# Patient Record
Sex: MALE | Race: WHITE | URBAN - METROPOLITAN AREA
[De-identification: names, ages, dates, MRNs, and addresses within clinical notes are randomized per-mention and may not be internally consistent; named-entity substitution may affect disease eponyms.]

---

## 2017-05-02 NOTE — H&P
[facility]  TOTAL EYE CARE  [address] [facility] [address]  [phone]  Dept: [phone]    OPHTHALMOLOGY PRE-OPERATIVE  HISTORY AND PHYSICAL    DATE OF H/P:  2017    DATE OF SURGERY:  May 8, 2017  PROCEDURE:  Procedure(s):  PHACOEMULSIFICATION WITH STANDARD INTRAOCULAR LENS IMPLANT, Left Eye  LENS IMPLANT:  ZCB00 +20.5  REFRACTIVE GOAL:  -2.00 Sph  SURGEON:  Piotr Berg MD    ANESTHESIA:  TOPICAL / MAC    OR CASE REQUIREMENTS:    DEMOGRAPHICS:  Demographic Information on Ash Corona:    Ash Corona  Gender: male  : 1960  [address] [facility] [id number] [phone] (home) [phone] (work)    Medical Record: [id number]  Social Security Number: [id number]  Pharmacy: Data Unavailable  Primary Care Provider: No primary care provider on file.    Parent's names are: Data Unavailable (mother) and Data Unavailable (father).    Insurance: Payor: [employer] / Plan: [employer] OPEN ACCESS FULLY INSURED / Product Type: HMO /     OCULAR HISTORY:  Cataracts, Diabetes - no retinopathy.    HISTORIES:  Diabetes type II, HTN.  No past medical history on file.    No past surgical history on file.    No family history on file.    Social History   Substance Use Topics     Smoking status: Not on file     Smokeless tobacco: Not on file     Alcohol use Not on file       MEDICATIONS:  No current facility-administered medications for this encounter.      No current outpatient prescriptions on file.       ALLERGIES:   Allergies not on file    PERTINENT SYSTEMS REVIEW:    1. No - Do you have a history of heart attack, stroke, stent, bypass or surgery on an artery in the head, neck, heart or legs?  2. No - Do you ever have any pain or discomfort in your chest?  3. No - Do you have a history of  Heart Failure?  4. No - Are you troubled by shortness of breath when walking: On the level, up a slight hill or at night?  5. No - Do you currently have a cold, bronchitis or other  respiratory infection?  6. No - Do you have a cough, shortness of breath or wheezing?  7. No - Do you sometimes get pains in the calves of your legs when you walk?  8. No - Do you or anyone in your family have previous history of blood clots?  9. No - Do you or does anyone in your family have a serious bleeding problem such as prolonged bleeding following surgeries or cuts?  10. No - Have you ever had problems with anemia or been told to take iron pills?  11. No - Have you had any abnormal blood loss such as black, tarry or bloody stools, or abnormal vaginal bleeding?  12. No - Have you ever had a blood transfusion?  13. No - Have you or any of your relatives ever had problems with anesthesia?  14. No - Do you have sleep apnea, excessive snoring or daytime drowsiness?  15. No - Do you have any prosthetic heart valves?  16. No - Do you have prosthetic joints?    EXAMINATION:  Vitals were reviewed                     HEENT:  Cataract, otherwise unremarkable.  LUNGS:  Clear  CV:  Regular rate and rhythm without murmur  ABD:  Soft and nontender  NEURO:  Alert and nonfocal    IMPRESSION:  Patient cleared for ophthalmic surgery.  Low risk with monitored, light sedation.  I have assessed the patient's DVT risk, and no additional orders necessary.    PLAN:  Procedure(s):  PHACOEMULSIFICATION WITH STANDARD INTRAOCULAR LENS IMPLANT, Left Eye      Piotr Berg MD

## 2017-05-04 ENCOUNTER — ANESTHESIA EVENT (OUTPATIENT)
Dept: SURGERY | Facility: CLINIC | Age: 57
End: 2017-05-04
Payer: COMMERCIAL

## 2017-05-08 ENCOUNTER — ANESTHESIA (OUTPATIENT)
Dept: SURGERY | Facility: CLINIC | Age: 57
End: 2017-05-08
Payer: COMMERCIAL

## 2017-05-08 ENCOUNTER — SURGERY (OUTPATIENT)
Age: 57
End: 2017-05-08

## 2017-05-08 ENCOUNTER — HOSPITAL ENCOUNTER (OUTPATIENT)
Facility: CLINIC | Age: 57
Discharge: HOME | End: 2017-05-08
Attending: OPHTHALMOLOGY | Admitting: OPHTHALMOLOGY
Payer: COMMERCIAL

## 2017-05-08 VITALS
BODY MASS INDEX: 29.82 KG/M2 | SYSTOLIC BLOOD PRESSURE: 124 MMHG | WEIGHT: 190 LBS | RESPIRATION RATE: 16 BRPM | HEIGHT: 67 IN | DIASTOLIC BLOOD PRESSURE: 73 MMHG | TEMPERATURE: 98.1 F | OXYGEN SATURATION: 99 %

## 2017-05-08 LAB — GLUCOSE BLDC GLUCOMTR-MCNC: 246 MG/DL (ref 70–99)

## 2017-05-08 PROCEDURE — 71000022 ZZH RECOVERY CATRACT PACKAGE: Performed by: OPHTHALMOLOGY

## 2017-05-08 PROCEDURE — 25000125 ZZHC RX 250: Performed by: OPHTHALMOLOGY

## 2017-05-08 PROCEDURE — 25000308 HC RX OP HPI UCR WEL MED 250 IP 250: Performed by: OPHTHALMOLOGY

## 2017-05-08 PROCEDURE — 25000128 H RX IP 250 OP 636: Performed by: NURSE ANESTHETIST, CERTIFIED REGISTERED

## 2017-05-08 PROCEDURE — V2632 POST CHMBR INTRAOCULAR LENS: HCPCS | Performed by: OPHTHALMOLOGY

## 2017-05-08 PROCEDURE — 82962 GLUCOSE BLOOD TEST: CPT

## 2017-05-08 PROCEDURE — 25000128 H RX IP 250 OP 636: Performed by: OPHTHALMOLOGY

## 2017-05-08 PROCEDURE — 25800025 ZZH RX 258: Performed by: NURSE ANESTHETIST, CERTIFIED REGISTERED

## 2017-05-08 PROCEDURE — 36000025 ZZH CATARACT SURGICAL PACKAGE: Performed by: OPHTHALMOLOGY

## 2017-05-08 PROCEDURE — 25000125 ZZHC RX 250: Performed by: NURSE ANESTHETIST, CERTIFIED REGISTERED

## 2017-05-08 PROCEDURE — 37000012 ZZH ANESTHESIA CATARACT PACKAGE: Performed by: OPHTHALMOLOGY

## 2017-05-08 DEVICE — EYE IMP IOL AMO PCL TECNIS ZCB00 20.5: Type: IMPLANTABLE DEVICE | Site: EYE | Status: FUNCTIONAL

## 2017-05-08 RX ORDER — SODIUM CHLORIDE, SODIUM LACTATE, POTASSIUM CHLORIDE, CALCIUM CHLORIDE 600; 310; 30; 20 MG/100ML; MG/100ML; MG/100ML; MG/100ML
INJECTION, SOLUTION INTRAVENOUS CONTINUOUS
Status: DISCONTINUED | OUTPATIENT
Start: 2017-05-08 | End: 2017-05-08 | Stop reason: HOSPADM

## 2017-05-08 RX ORDER — TROPICAMIDE 10 MG/ML
1 SOLUTION/ DROPS OPHTHALMIC
Status: COMPLETED | OUTPATIENT
Start: 2017-05-08 | End: 2017-05-08

## 2017-05-08 RX ORDER — CYCLOPENTOLATE HYDROCHLORIDE 10 MG/ML
1 SOLUTION/ DROPS OPHTHALMIC
Status: COMPLETED | OUTPATIENT
Start: 2017-05-08 | End: 2017-05-08

## 2017-05-08 RX ORDER — PROPARACAINE HYDROCHLORIDE 5 MG/ML
SOLUTION/ DROPS OPHTHALMIC PRN
Status: DISCONTINUED | OUTPATIENT
Start: 2017-05-08 | End: 2017-05-08 | Stop reason: HOSPADM

## 2017-05-08 RX ORDER — PHENYLEPHRINE HYDROCHLORIDE 25 MG/ML
1 SOLUTION/ DROPS OPHTHALMIC
Status: COMPLETED | OUTPATIENT
Start: 2017-05-08 | End: 2017-05-08

## 2017-05-08 RX ORDER — BALANCED SALT SOLUTION 6.4; .75; .48; .3; 3.9; 1.7 MG/ML; MG/ML; MG/ML; MG/ML; MG/ML; MG/ML
SOLUTION OPHTHALMIC PRN
Status: DISCONTINUED | OUTPATIENT
Start: 2017-05-08 | End: 2017-05-08 | Stop reason: HOSPADM

## 2017-05-08 RX ORDER — TENOFOVIR DISOPROXIL FUMARATE 300 MG/1
300 TABLET, FILM COATED ORAL DAILY
COMMUNITY

## 2017-05-08 RX ORDER — LIDOCAINE 40 MG/G
CREAM TOPICAL
Status: DISCONTINUED | OUTPATIENT
Start: 2017-05-08 | End: 2017-05-08 | Stop reason: HOSPADM

## 2017-05-08 RX ADMIN — LIDOCAINE HYDROCHLORIDE 1 TUBE: 20 JELLY TOPICAL at 08:10

## 2017-05-08 RX ADMIN — PHENYLEPHRINE HYDROCHLORIDE 1 DROP: 25 SOLUTION/ DROPS OPHTHALMIC at 07:10

## 2017-05-08 RX ADMIN — MIDAZOLAM HYDROCHLORIDE 2 MG: 1 INJECTION, SOLUTION INTRAMUSCULAR; INTRAVENOUS at 08:01

## 2017-05-08 RX ADMIN — CYCLOPENTOLATE HYDROCHLORIDE 1 DROP: 10 SOLUTION/ DROPS OPHTHALMIC at 06:49

## 2017-05-08 RX ADMIN — SODIUM CHLORIDE, POTASSIUM CHLORIDE, SODIUM LACTATE AND CALCIUM CHLORIDE: 600; 310; 30; 20 INJECTION, SOLUTION INTRAVENOUS at 07:10

## 2017-05-08 RX ADMIN — BALANCED SALT SOLUTION 75 ML: 6.4; .75; .48; .3; 3.9; 1.7 SOLUTION OPHTHALMIC at 08:09

## 2017-05-08 RX ADMIN — TROPICAMIDE 1 DROP: 10 SOLUTION/ DROPS OPHTHALMIC at 07:01

## 2017-05-08 RX ADMIN — PHENYLEPHRINE HYDROCHLORIDE 1 DROP: 25 SOLUTION/ DROPS OPHTHALMIC at 07:02

## 2017-05-08 RX ADMIN — LIDOCAINE HYDROCHLORIDE 1 ML: 10 INJECTION, SOLUTION INFILTRATION; PERINEURAL at 07:10

## 2017-05-08 RX ADMIN — CYCLOPENTOLATE HYDROCHLORIDE 1 DROP: 10 SOLUTION/ DROPS OPHTHALMIC at 07:01

## 2017-05-08 RX ADMIN — EPINEPHRINE 4 ML: 1 INJECTION, SOLUTION, CONCENTRATE INTRAVENOUS at 08:10

## 2017-05-08 RX ADMIN — PROPARACAINE HYDROCHLORIDE 2 DROP: 5 SOLUTION/ DROPS OPHTHALMIC at 08:10

## 2017-05-08 RX ADMIN — TROPICAMIDE 1 DROP: 10 SOLUTION/ DROPS OPHTHALMIC at 06:51

## 2017-05-08 RX ADMIN — CYCLOPENTOLATE HYDROCHLORIDE 1 DROP: 10 SOLUTION/ DROPS OPHTHALMIC at 07:09

## 2017-05-08 RX ADMIN — PHENYLEPHRINE HYDROCHLORIDE 1 DROP: 25 SOLUTION/ DROPS OPHTHALMIC at 06:51

## 2017-05-08 RX ADMIN — TROPICAMIDE 1 DROP: 10 SOLUTION/ DROPS OPHTHALMIC at 07:09

## 2017-05-08 NOTE — ANESTHESIA CARE TRANSFER NOTE
Patient: Ash Corona    Procedure(s):  Left Cataract Removal with Implant - Wound Class: I-Clean    Diagnosis: cataract  Diagnosis Additional Information: No value filed.    Anesthesia Type:   MAC     Note:  Airway :Room Air  Patient transferred to:Phase II        Vitals: (Last set prior to Anesthesia Care Transfer)    CRNA VITALS  5/8/2017 0749 - 5/8/2017 0820      5/8/2017             NIBP: 117/76    Pulse: 65    NIBP Mean: 90    SpO2: 95 %                Electronically Signed By: CORRINA Bradford CRNA  May 8, 2017  8:20 AM

## 2017-05-08 NOTE — IP AVS SNAPSHOT
[facility] PreOP/Phase II    [address] [address]    Phone:  [phone]    Fax:  [phone]                                       After Visit Summary   5/8/2017    Ash Corona    MRN: [id number]           After Visit Summary Signature Page     I have received my discharge instructions, and my questions have been answered. I have discussed any challenges I see with this plan with the nurse or doctor.    ..........................................................................................................................................  Patient/Patient Representative Signature      ..........................................................................................................................................  Patient Representative Print Name and Relationship to Patient    ..................................................               ................................................  Date                                            Time    ..........................................................................................................................................  Reviewed by Signature/Title    ...................................................              ..............................................  Date                                                            Time

## 2017-05-08 NOTE — OP NOTE
CATARACT OPERATIVE NOTE    PATIENT: Ash oCrona  DATE OF SURGERY: 5/8/2017  PREOPERATIVE DIAGNOSIS:  Senile Nuclear Cataract, Left eye  POSTOPERATIVE DIAGNOSIS:  Senile Nuclear Cataract, Left eye  OPERATIVE PROCEDURE:  Phacoemulsification and placement of intraocular lens  SURGEON:  Piotr Berg MD  ANESTHESIA:  Topical / MAC  EBL:  None  SPECIMENS:  None  COMPLICATIONS:  None    PROCEDURE:  The patient was brought to the operating room at [facility].  The left eye was prepped and draped in the usual fashion for cataract surgery.  A wire lid speculum was inserted.  A super sharp blade was used to make a paracentesis at the 5 O'clock position.  The super sharp blade was used to make a partial thickness temporal groove, which was 3 mm in length.  0.8 mL of non-preserved epi-Shugarcaine was injected into the anterior chamber.  Viscoelastic was used to inflate the anterior chamber through a cannula.  A 2.5 mm microkeratome was used to make a temporal clear corneal incision in a two-plane fashion.  A cystotome needle and forceps were used to make a capsulorrhexis.  Hydrodissection and hydrodelineation were performed with Balance Salt Solution.  The lens was then phacoemulsified and removed without complications.  The cortical material was removed with bimanual irrigation and aspiration.  The capsular bag was filled with viscoelastic.  A posterior chamber intraocular lens, preselected and recorded, was folded and inserted into the capsular bag.  The viscoelastic was removed with the irrigation and aspiration tip.  Balanced Salt Solution was used to refill the anterior chamber.  The wounds were checked for water tightness and required no suture.  The wire lid speculum was removed.  The patient's left eye was cleaned and a drop of each post-operative drop was placed, followed by a trujillo shield.  The patient tolerated the procedure well, and there were no complications.      Piotr PHILLIPS  MD [place]

## 2017-05-08 NOTE — ANESTHESIA PREPROCEDURE EVALUATION
Anesthesia Evaluation     . Pt has had prior anesthetic. Type: MAC and General           ROS/MED HX    ENT/Pulmonary:  - neg pulmonary ROS     Neurologic:  - neg neurologic ROS     Cardiovascular:     (+) Dyslipidemia, ----. : . . . :. .       METS/Exercise Tolerance:  >4 METS   Hematologic:  - neg hematologic  ROS       Musculoskeletal:  - neg musculoskeletal ROS       GI/Hepatic:     (+) hepatitis type B,       Renal/Genitourinary:         Endo:     (+) type II DM Using insulin - not using insulin pump Normal glucose range: took insulin last night Diabetic complications: retinopathy, .      Psychiatric:         Infectious Disease:  - neg infectious disease ROS       Malignancy:      - no malignancy   Other:    - neg other ROS                 Physical Exam  Normal systems: cardiovascular, pulmonary and dental    Airway   Mallampati: II  TM distance: >3 FB  Neck ROM: full    Dental     Cardiovascular       Pulmonary                     Anesthesia Plan      History & Physical Review  History and physical reviewed and following examination; no interval change.    ASA Status:  2 .    NPO Status:  > 8 hours    Plan for MAC Reason for MAC:  Other - see comments  PONV prophylaxis:  Ondansetron (or other 5HT-3) and Dexamethasone or Solumedrol       Postoperative Care  Postoperative pain management:  IV analgesics and Oral pain medications.      Consents  Anesthetic plan, risks, benefits and alternatives discussed with:  Patient..                          .

## 2017-05-08 NOTE — ANESTHESIA POSTPROCEDURE EVALUATION
Patient: Ash Corona    Procedure(s):  Left Cataract Removal with Implant - Wound Class: I-Clean    Diagnosis:cataract  Diagnosis Additional Information: No value filed.    Anesthesia Type:  MAC    Note:  Anesthesia Post Evaluation    Patient location during evaluation: Phase 2  Patient participation: Able to fully participate in evaluation  Level of consciousness: awake and alert  Pain management: adequate  Airway patency: patent  Cardiovascular status: acceptable and hemodynamically stable  Respiratory status: acceptable, room air and spontaneous ventilation  Hydration status: acceptable  PONV: none     Anesthetic complications: None          Last vitals:  Vitals:    05/08/17 0641   BP: 118/70   Resp: 16   Temp: 36.7  C (98.1  F)   SpO2: 96%         Electronically Signed By: CORRINA Bradford CRNA  May 8, 2017  8:22 AM

## 2017-05-08 NOTE — IP AVS SNAPSHOT
MRN:[id number]                      After Visit Summary   5/8/2017    Ash Corona    MRN: [id number]           Thank you!     Thank you for choosing [facility] for your care. Our goal is always to provide you with excellent care. Hearing back from our patients is one way we can continue to improve our services. Please take a few minutes to complete the written survey that you may receive in the mail after you visit with us. Thank you!        Patient Information     Date Of Birth          1960        About your hospital stay     You were admitted on:  May 8, 2017 You last received care in the:  [facility] PreOP/Phase II    You were discharged on:  May 8, 2017       Who to Call     For medical emergencies, please call 911.  For non-urgent questions about your medical care, please call your primary care provider or clinic, None  For questions related to your surgery, please call your surgery clinic        Attending Provider     Provider Piotr Ding MD Ophthalmology       Primary Care Provider    None Specified       No primary provider on file.        After Care Instructions     Eye drops as prescribed by physician.  Start drops today unless told otherwise by the physician           May use Tylenol or Advil for pain as directed by the physician           Notify Physician if you have severe headache or nausea           Remove patch per physician instruction           Return to clinic as instructed by physician           Wear eye shield or patch as directed                 Your next 10 appointments already scheduled     Jun 05, 2017   Procedure with Piotr Berg MD   [facility] PeriOP Services (--)    [address] [place] [address]   [phone]           The medical center is located at [address]. (between [address] in [place], [place] north of [place]).              Pending Results     No orders found from  "2017 to 2017.            Admission Information     Date & Time Provider Department Dept. Phone    2017 Piotr Berg MD [facility] PreOP/Phase [phone]      Your Vitals Were     Blood Pressure Temperature Respirations Height Weight Pulse Oximetry    118/70 98.1  F (36.7  C) (Oral) 16 1.702 m (5' 7\") 86.2 kg (190 lb) 96%    BMI (Body Mass Index)                   29.76 kg/m2           [employer] Information     [employer] lets you send messages to your doctor, view your test results, renew your prescriptions, schedule appointments and more. To sign up, go to [email or web address] . Click on \"Log in\" on the left side of the screen, which will take you to the Welcome page. Then click on \"Sign up Now\" on the right side of the page.     You will be asked to enter the access code listed below, as well as some personal information. Please follow the directions to create your username and password.     Your access code is: [email or web address]  Expires: 2017  8:44 AM     Your access code will  in 90 days. If you need help or a new code, please call your [facility] clinic or [phone].        Care EveryWhere ID     This is your Care EveryWhere ID. This could be used by other organizations to access your [facility] medical records  [id number]           Review of your medicines      CONTINUE these medicines which have NOT CHANGED        Dose / Directions    ASPIRIN PO        Dose:  325 mg   Take 325 mg by mouth   Refills:  0       calcium-vitamin D 600-400 MG-UNIT per tablet   Commonly known as:  CALTRATE        Dose:  1 tablet   Take 1 tablet by mouth 2 times daily   Refills:  0       canaliflozin tablet   Commonly known as:  INVOKANA        Dose:  300 mg   Take 300 mg by mouth every morning (before breakfast)   Refills:  0       LOSARTAN POTASSIUM PO        Dose:  50 mg   Take 50 mg by mouth   Refills:  0       OMEPRAZOLE PO        Dose:  20 mg   Take 20 mg by mouth   Refills:  0       " SIMVASTATIN PO        Dose:  40 mg   Take 40 mg by mouth   Refills:  0       tenofovir 300 MG tablet   Commonly known as:  VIREAD        Dose:  300 mg   Take 300 mg by mouth daily   Refills:  0       TRADJENTA 5 MG Tabs tablet   Generic drug:  linagliptin        Dose:  5 mg   Take 5 mg by mouth daily   Refills:  0                Protect others around you: Learn how to safely use, store and throw away your medicines at www.disposemymeds.org.             Medication List: This is a list of all your medications and when to take them. Check marks below indicate your daily home schedule. Keep this list as a reference.      Medications           Morning Afternoon Evening Bedtime As Needed    ASPIRIN PO   Take 325 mg by mouth                                calcium-vitamin D 600-400 MG-UNIT per tablet   Commonly known as:  CALTRATE   Take 1 tablet by mouth 2 times daily                                canaliflozin tablet   Commonly known as:  INVOKANA   Take 300 mg by mouth every morning (before breakfast)                                LOSARTAN POTASSIUM PO   Take 50 mg by mouth                                OMEPRAZOLE PO   Take 20 mg by mouth                                SIMVASTATIN PO   Take 40 mg by mouth                                tenofovir 300 MG tablet   Commonly known as:  VIREAD   Take 300 mg by mouth daily                                TRADJENTA 5 MG Tabs tablet   Take 5 mg by mouth daily   Generic drug:  linagliptin

## 2017-05-31 NOTE — H&P
[facility]  TOTAL EYE CARE  [address]  [facility] [address]  [phone]  Dept: [phone]    OPHTHALMOLOGY PRE-OPERATIVE  HISTORY AND PHYSICAL    DATE OF H/P:  2017    DATE OF SURGERY:  2017  PROCEDURE:  Procedure(s):  PHACOEMULSIFICATION WITH STANDARD INTRAOCULAR LENS IMPLANT, Right Eye  LENS IMPLANT:  ZCB00 +20.0  REFRACTIVE GOAL:  -2.00 sph  SURGEON:  Piotr Berg MD    ANESTHESIA:  TOPICAL / MAC    OR CASE REQUIREMENTS:    DEMOGRAPHICS:  Demographic Information on Ash Corona:    Ash Corona  Gender: male  : 1960  [address] [facility] [address]  [phone] (home)     Medical Record: [id number]  Social Security Number: [id number]  Pharmacy: Data Unavailable  Primary Care Provider: No primary care provider on file.    Parent's names are: Data Unavailable (mother) and Data Unavailable (father).    Insurance: Payor: [employer] / Plan: [employer] OPEN ACCESS FULLY INSURED / Product Type: HMO /     OCULAR HISTORY:  Cataracts, s/p IOL OS.    HISTORIES:  Diabetes type II, HTN    No past medical history on file.    Past Surgical History:   Procedure Laterality Date     EYE SURGERY       PHACOEMULSIFICATION WITH STANDARD INTRAOCULAR LENS IMPLANT Left 2017    Procedure: PHACOEMULSIFICATION WITH STANDARD INTRAOCULAR LENS IMPLANT;  Left Cataract Removal with Implant;  Surgeon: Piotr Berg MD;  Location: [facility] OR       History reviewed. No pertinent family history.    Social History   Substance Use Topics     Smoking status: Not on file     Smokeless tobacco: Not on file     Alcohol use Not on file       MEDICATIONS:  No current facility-administered medications for this encounter.      Current Outpatient Prescriptions   Medication Sig     calcium-vitamin D (CALTRATE) 600-400 MG-UNIT per tablet Take 1 tablet by mouth 2 times daily     OMEPRAZOLE PO Take 20 mg by mouth     LOSARTAN POTASSIUM PO Take 50 mg by mouth     linagliptin (TRADJENTA)  5 MG TABS tablet Take 5 mg by mouth daily     ASPIRIN PO Take 325 mg by mouth     SIMVASTATIN PO Take 40 mg by mouth     canaliflozin (INVOKANA) tablet Take 300 mg by mouth every morning (before breakfast)     tenofovir (VIREAD) 300 MG tablet Take 300 mg by mouth daily       ALLERGIES:     Allergies   Allergen Reactions     Interferon Dragan Other (See Comments)     seizures     Lisinopril Cough       PERTINENT SYSTEMS REVIEW:    1. No - Do you have a history of heart attack, stroke, stent, bypass or surgery on an artery in the head, neck, heart or legs?  2. No - Do you ever have any pain or discomfort in your chest?  3. No - Do you have a history of  Heart Failure?  4. No - Are you troubled by shortness of breath when walking: On the level, up a slight hill or at night?  5. No - Do you currently have a cold, bronchitis or other respiratory infection?  6. No - Do you have a cough, shortness of breath or wheezing?  7. No - Do you sometimes get pains in the calves of your legs when you walk?  8. No - Do you or anyone in your family have previous history of blood clots?  9. No - Do you or does anyone in your family have a serious bleeding problem such as prolonged bleeding following surgeries or cuts?  10. No - Have you ever had problems with anemia or been told to take iron pills?  11. No - Have you had any abnormal blood loss such as black, tarry or bloody stools, or abnormal vaginal bleeding?  12. No - Have you ever had a blood transfusion?  13. No - Have you or any of your relatives ever had problems with anesthesia?  14. No - Do you have sleep apnea, excessive snoring or daytime drowsiness?  15. No - Do you have any prosthetic heart valves?  16. No - Do you have prosthetic joints?    EXAMINATION:  Vitals were reviewed                     Vison:  Va, right - 20/40;   BAT, right - 20/80  HEENT:  Cataract, otherwise unremarkable.  LUNGS:  Clear  CV:  Regular rate and rhythm without murmur  ABD:  Soft and  nontender  NEURO:  Alert and nonfocal    IMPRESSION:  Patient cleared for ophthalmic surgery.  Low risk with monitored, light sedation.  I have assessed the patient's DVT risk, and no additional orders necessary.    PLAN:  Procedure(s):  PHACOEMULSIFICATION WITH STANDARD INTRAOCULAR LENS IMPLANT, Right Eye      Piotr Berg MD

## 2017-06-02 ENCOUNTER — ANESTHESIA EVENT (OUTPATIENT)
Dept: SURGERY | Facility: CLINIC | Age: 57
End: 2017-06-02
Payer: COMMERCIAL

## 2017-06-02 NOTE — ANESTHESIA PREPROCEDURE EVALUATION
Anesthesia Evaluation     . Pt has had prior anesthetic. Type: MAC and General           ROS/MED HX    ENT/Pulmonary:  - neg pulmonary ROS     Neurologic:  - neg neurologic ROS     Cardiovascular:     (+) Dyslipidemia, ----. : . . . :. .       METS/Exercise Tolerance:  >4 METS   Hematologic:  - neg hematologic  ROS       Musculoskeletal:  - neg musculoskeletal ROS       GI/Hepatic:     (+) hepatitis type B,       Renal/Genitourinary:         Endo:     (+) type II DM Using insulin - not using insulin pump Normal glucose range: took insulin last night Diabetic complications: retinopathy, .      Psychiatric:         Infectious Disease:  - neg infectious disease ROS       Malignancy:      - no malignancy   Other:    - neg other ROS                 Physical Exam  Normal systems: cardiovascular, pulmonary and dental    Airway   Mallampati: II  TM distance: >3 FB  Neck ROM: full    Dental     Cardiovascular       Pulmonary                     Anesthesia Plan      History & Physical Review  History and physical reviewed and following examination; no interval change.    ASA Status:  2 .    NPO Status:  > 6 hours    Plan for MAC Reason for MAC:  Deep or markedly invasive procedure (G8)         Postoperative Care      Consents  Anesthetic plan, risks, benefits and alternatives discussed with:  Patient..                          .

## 2017-06-05 ENCOUNTER — HOSPITAL ENCOUNTER (OUTPATIENT)
Facility: CLINIC | Age: 57
Discharge: HOME | End: 2017-06-05
Attending: OPHTHALMOLOGY | Admitting: OPHTHALMOLOGY
Payer: COMMERCIAL

## 2017-06-05 ENCOUNTER — ANESTHESIA (OUTPATIENT)
Dept: SURGERY | Facility: CLINIC | Age: 57
End: 2017-06-05
Payer: COMMERCIAL

## 2017-06-05 ENCOUNTER — SURGERY (OUTPATIENT)
Age: 57
End: 2017-06-05

## 2017-06-05 VITALS
DIASTOLIC BLOOD PRESSURE: 73 MMHG | WEIGHT: 190 LBS | TEMPERATURE: 98.3 F | BODY MASS INDEX: 29.82 KG/M2 | HEIGHT: 67 IN | RESPIRATION RATE: 20 BRPM | SYSTOLIC BLOOD PRESSURE: 107 MMHG | OXYGEN SATURATION: 95 %

## 2017-06-05 LAB — GLUCOSE BLDC GLUCOMTR-MCNC: 244 MG/DL (ref 70–99)

## 2017-06-05 PROCEDURE — 25000125 ZZHC RX 250: Performed by: OPHTHALMOLOGY

## 2017-06-05 PROCEDURE — 37000012 ZZH ANESTHESIA CATARACT PACKAGE: Performed by: OPHTHALMOLOGY

## 2017-06-05 PROCEDURE — 25000125 ZZHC RX 250: Performed by: NURSE ANESTHETIST, CERTIFIED REGISTERED

## 2017-06-05 PROCEDURE — 71000022 ZZH RECOVERY CATRACT PACKAGE: Performed by: OPHTHALMOLOGY

## 2017-06-05 PROCEDURE — 25000128 H RX IP 250 OP 636: Performed by: NURSE ANESTHETIST, CERTIFIED REGISTERED

## 2017-06-05 PROCEDURE — 25000128 H RX IP 250 OP 636: Performed by: OPHTHALMOLOGY

## 2017-06-05 PROCEDURE — 25000308 HC RX OP HPI UCR WEL MED 250 IP 250: Performed by: OPHTHALMOLOGY

## 2017-06-05 PROCEDURE — V2632 POST CHMBR INTRAOCULAR LENS: HCPCS | Performed by: OPHTHALMOLOGY

## 2017-06-05 PROCEDURE — 36000025 ZZH CATARACT SURGICAL PACKAGE: Performed by: OPHTHALMOLOGY

## 2017-06-05 PROCEDURE — 82962 GLUCOSE BLOOD TEST: CPT

## 2017-06-05 DEVICE — EYE IMP IOL AMO PCL TECNIS ZCB00 20.0: Type: IMPLANTABLE DEVICE | Site: EYE | Status: FUNCTIONAL

## 2017-06-05 RX ORDER — LOSARTAN POTASSIUM 50 MG/1
50 TABLET ORAL
COMMUNITY
Start: 2014-12-22

## 2017-06-05 RX ORDER — SODIUM CHLORIDE, SODIUM LACTATE, POTASSIUM CHLORIDE, CALCIUM CHLORIDE 600; 310; 30; 20 MG/100ML; MG/100ML; MG/100ML; MG/100ML
INJECTION, SOLUTION INTRAVENOUS CONTINUOUS
Status: DISCONTINUED | OUTPATIENT
Start: 2017-06-05 | End: 2017-06-05 | Stop reason: HOSPADM

## 2017-06-05 RX ORDER — FENTANYL CITRATE 50 UG/ML
25-50 INJECTION, SOLUTION INTRAMUSCULAR; INTRAVENOUS
Status: DISCONTINUED | OUTPATIENT
Start: 2017-06-05 | End: 2017-06-05 | Stop reason: HOSPADM

## 2017-06-05 RX ORDER — KETOROLAC TROMETHAMINE 30 MG/ML
30 INJECTION, SOLUTION INTRAMUSCULAR; INTRAVENOUS EVERY 6 HOURS PRN
Status: DISCONTINUED | OUTPATIENT
Start: 2017-06-05 | End: 2017-06-05 | Stop reason: HOSPADM

## 2017-06-05 RX ORDER — CYCLOPENTOLATE HYDROCHLORIDE 10 MG/ML
1 SOLUTION/ DROPS OPHTHALMIC
Status: COMPLETED | OUTPATIENT
Start: 2017-06-05 | End: 2017-06-05

## 2017-06-05 RX ORDER — PHENYLEPHRINE HYDROCHLORIDE 25 MG/ML
1 SOLUTION/ DROPS OPHTHALMIC
Status: COMPLETED | OUTPATIENT
Start: 2017-06-05 | End: 2017-06-05

## 2017-06-05 RX ORDER — ALBUTEROL SULFATE 0.83 MG/ML
2.5 SOLUTION RESPIRATORY (INHALATION) EVERY 4 HOURS PRN
Status: DISCONTINUED | OUTPATIENT
Start: 2017-06-05 | End: 2017-06-05 | Stop reason: HOSPADM

## 2017-06-05 RX ORDER — METOCLOPRAMIDE HYDROCHLORIDE 5 MG/ML
10 INJECTION INTRAMUSCULAR; INTRAVENOUS EVERY 6 HOURS PRN
Status: DISCONTINUED | OUTPATIENT
Start: 2017-06-05 | End: 2017-06-05 | Stop reason: HOSPADM

## 2017-06-05 RX ORDER — PROPARACAINE HYDROCHLORIDE 5 MG/ML
SOLUTION/ DROPS OPHTHALMIC PRN
Status: DISCONTINUED | OUTPATIENT
Start: 2017-06-05 | End: 2017-06-05 | Stop reason: HOSPADM

## 2017-06-05 RX ORDER — BALANCED SALT SOLUTION 6.4; .75; .48; .3; 3.9; 1.7 MG/ML; MG/ML; MG/ML; MG/ML; MG/ML; MG/ML
SOLUTION OPHTHALMIC PRN
Status: DISCONTINUED | OUTPATIENT
Start: 2017-06-05 | End: 2017-06-05 | Stop reason: HOSPADM

## 2017-06-05 RX ORDER — ONDANSETRON 2 MG/ML
4 INJECTION INTRAMUSCULAR; INTRAVENOUS EVERY 30 MIN PRN
Status: DISCONTINUED | OUTPATIENT
Start: 2017-06-05 | End: 2017-06-05 | Stop reason: HOSPADM

## 2017-06-05 RX ORDER — LIDOCAINE 40 MG/G
CREAM TOPICAL
Status: DISCONTINUED | OUTPATIENT
Start: 2017-06-05 | End: 2017-06-05 | Stop reason: HOSPADM

## 2017-06-05 RX ORDER — ONDANSETRON 4 MG/1
4 TABLET, ORALLY DISINTEGRATING ORAL EVERY 30 MIN PRN
Status: DISCONTINUED | OUTPATIENT
Start: 2017-06-05 | End: 2017-06-05 | Stop reason: HOSPADM

## 2017-06-05 RX ORDER — NALOXONE HYDROCHLORIDE 0.4 MG/ML
.1-.4 INJECTION, SOLUTION INTRAMUSCULAR; INTRAVENOUS; SUBCUTANEOUS
Status: DISCONTINUED | OUTPATIENT
Start: 2017-06-05 | End: 2017-06-05 | Stop reason: HOSPADM

## 2017-06-05 RX ORDER — TROPICAMIDE 10 MG/ML
1 SOLUTION/ DROPS OPHTHALMIC
Status: COMPLETED | OUTPATIENT
Start: 2017-06-05 | End: 2017-06-05

## 2017-06-05 RX ORDER — HYDROMORPHONE HYDROCHLORIDE 1 MG/ML
.3-.5 INJECTION, SOLUTION INTRAMUSCULAR; INTRAVENOUS; SUBCUTANEOUS EVERY 10 MIN PRN
Status: DISCONTINUED | OUTPATIENT
Start: 2017-06-05 | End: 2017-06-05 | Stop reason: HOSPADM

## 2017-06-05 RX ORDER — MEPERIDINE HYDROCHLORIDE 25 MG/ML
12.5 INJECTION INTRAMUSCULAR; INTRAVENOUS; SUBCUTANEOUS
Status: DISCONTINUED | OUTPATIENT
Start: 2017-06-05 | End: 2017-06-05 | Stop reason: HOSPADM

## 2017-06-05 RX ORDER — METOCLOPRAMIDE 10 MG/1
10 TABLET ORAL EVERY 6 HOURS PRN
Status: DISCONTINUED | OUTPATIENT
Start: 2017-06-05 | End: 2017-06-05 | Stop reason: HOSPADM

## 2017-06-05 RX ADMIN — LIDOCAINE HYDROCHLORIDE 1 ML: 10 INJECTION, SOLUTION INFILTRATION; PERINEURAL at 07:00

## 2017-06-05 RX ADMIN — CYCLOPENTOLATE HYDROCHLORIDE 1 DROP: 10 SOLUTION/ DROPS OPHTHALMIC at 07:10

## 2017-06-05 RX ADMIN — SODIUM CHLORIDE, POTASSIUM CHLORIDE, SODIUM LACTATE AND CALCIUM CHLORIDE: 600; 310; 30; 20 INJECTION, SOLUTION INTRAVENOUS at 07:11

## 2017-06-05 RX ADMIN — PHENYLEPHRINE HYDROCHLORIDE 1 DROP: 25 SOLUTION/ DROPS OPHTHALMIC at 07:00

## 2017-06-05 RX ADMIN — PHENYLEPHRINE HYDROCHLORIDE 1 DROP: 25 SOLUTION/ DROPS OPHTHALMIC at 07:11

## 2017-06-05 RX ADMIN — PHENYLEPHRINE HYDROCHLORIDE 1 DROP: 25 SOLUTION/ DROPS OPHTHALMIC at 06:49

## 2017-06-05 RX ADMIN — EPINEPHRINE 0.4 ML: 1 INJECTION, SOLUTION, CONCENTRATE INTRAVENOUS at 08:11

## 2017-06-05 RX ADMIN — TROPICAMIDE 1 DROP: 10 SOLUTION/ DROPS OPHTHALMIC at 06:47

## 2017-06-05 RX ADMIN — TROPICAMIDE 1 DROP: 10 SOLUTION/ DROPS OPHTHALMIC at 07:01

## 2017-06-05 RX ADMIN — MIDAZOLAM HYDROCHLORIDE 2 MG: 1 INJECTION, SOLUTION INTRAMUSCULAR; INTRAVENOUS at 08:05

## 2017-06-05 RX ADMIN — CYCLOPENTOLATE HYDROCHLORIDE 1 DROP: 10 SOLUTION/ DROPS OPHTHALMIC at 06:50

## 2017-06-05 RX ADMIN — BALANCED SALT SOLUTION 125 ML: 6.4; .75; .48; .3; 3.9; 1.7 SOLUTION OPHTHALMIC at 08:23

## 2017-06-05 RX ADMIN — CYCLOPENTOLATE HYDROCHLORIDE 1 DROP: 10 SOLUTION/ DROPS OPHTHALMIC at 06:59

## 2017-06-05 RX ADMIN — PROPARACAINE HYDROCHLORIDE 2 DROP: 5 SOLUTION/ DROPS OPHTHALMIC at 08:06

## 2017-06-05 RX ADMIN — LIDOCAINE HYDROCHLORIDE 1 TUBE: 20 JELLY TOPICAL at 08:24

## 2017-06-05 RX ADMIN — TROPICAMIDE 1 DROP: 10 SOLUTION/ DROPS OPHTHALMIC at 07:09

## 2017-06-05 NOTE — OP NOTE
OPHTHALMOLOGY OPERATIVE NOTE    PATIENT: Ash Corona  DATE OF SURGERY: 6/5/2017  PREOPERATIVE DIAGNOSIS:  Senile Nuclear Cataract, Right eye  POSTOPERATIVE DIAGNOSIS:  Senile Nuclear Cataract, Right eye  OPERATIVE PROCEDURE:  Phacoemulsification with placement of intraocular lens  SURGEON:  Piotr Berg MD  ANESTHESIA:  Topical / MAC  EBL:  None  SPECIMENS:  None  COMPLICATIONS:  None    PROCEDURE:  The patient was brought to the operating room at [facility].  The right eye was prepped and draped in the usual fashion for cataract surgery.  A wire lid speculum was inserted.  A super sharp blade was used to make a paracentesis at the 11 O'clock position.  The super sharp blade was used to make a partial thickness temporal groove, which was 3 mm in length.  0.8 mL of non-preserved epi-Shugarcaine was injected into the anterior chamber.  Viscoelastic was used to inflate the anterior chamber through a cannula.  A 2.5 mm microkeratome was used to make a temporal clear corneal incision in a two-plane fashion.  A cystotome needle and forceps were used to make a capsulorrhexis.  Hydrodissection and hydrodelineation were performed with Balance Salt Solution.  The lens was then phacoemulsified and removed without complications.  The cortical material was removed with bimanual irrigation and aspiration.  The capsular bag was filled with viscoelastic.  A posterior chamber intraocular lens, preselected and recorded, was folded and inserted into the capsular bag.  The viscoelastic was removed with the irrigation and aspiration tip.  Balanced Salt Solution was used to refill the anterior chamber.  The wounds were checked for water tightness and required no suture.  The wire lid speculum was removed.  The patient's right eye was cleaned and a drop of each post-operative drop was placed, followed by a trujillo shield.  The patient tolerated the procedure well, and there were no  complications.      Piotr Berg MD

## 2017-06-05 NOTE — ANESTHESIA POSTPROCEDURE EVALUATION
Patient: Ash Corona    Procedure(s):  Right Cataract Removal with Implant - Wound Class: I-Clean    Diagnosis:cataract  Diagnosis Additional Information: No value filed.    Anesthesia Type:  MAC    Note:  Anesthesia Post Evaluation    Patient location during evaluation: Bedside  Patient participation: Able to fully participate in evaluation  Level of consciousness: awake and alert  Pain management: adequate  Airway patency: patent  Cardiovascular status: acceptable  Respiratory status: acceptable  Hydration status: acceptable  PONV: none     Anesthetic complications: None          Last vitals:  Vitals:    06/05/17 0636   BP: 114/72   Resp: 16   Temp: 36.8  C (98.3  F)   SpO2: 94%         Electronically Signed By: Santos Camacho CRNA, APRN CRNA  June 5, 2017  8:24 AM

## 2017-06-05 NOTE — ANESTHESIA CARE TRANSFER NOTE
Patient: Ash Corona    Procedure(s):  Right Cataract Removal with Implant - Wound Class: I-Clean    Diagnosis: cataract  Diagnosis Additional Information: No value filed.    Anesthesia Type:   MAC     Note:    Patient transferred to:Phase II        Vitals: (Last set prior to Anesthesia Care Transfer)    CRNA VITALS  6/5/2017 0754 - 6/5/2017 0824      6/5/2017             Pulse: 63    SpO2: 95 %                Electronically Signed By: Santos Camacho CRNA, APRN CRNA  June 5, 2017  8:24 AM

## 2021-05-24 ENCOUNTER — RECORDS - HEALTHEAST (OUTPATIENT)
Dept: ADMINISTRATIVE | Facility: CLINIC | Age: 61
End: 2021-05-24

## 2021-05-26 ENCOUNTER — RECORDS - HEALTHEAST (OUTPATIENT)
Dept: ADMINISTRATIVE | Facility: CLINIC | Age: 61
End: 2021-05-26

## 2021-05-27 ENCOUNTER — RECORDS - HEALTHEAST (OUTPATIENT)
Dept: ADMINISTRATIVE | Facility: CLINIC | Age: 61
End: 2021-05-27

## 2021-05-28 ENCOUNTER — RECORDS - HEALTHEAST (OUTPATIENT)
Dept: ADMINISTRATIVE | Facility: CLINIC | Age: 61
End: 2021-05-28

## 2021-05-29 ENCOUNTER — RECORDS - HEALTHEAST (OUTPATIENT)
Dept: ADMINISTRATIVE | Facility: CLINIC | Age: 61
End: 2021-05-29

## (undated) DEVICE — SOL WATER IRRIG 1000ML BOTTLE 07139-09

## (undated) DEVICE — PREP PAD ALCOHOL 6818

## (undated) DEVICE — SOL NACL 0.9% IRRIG 1000ML BOTTLE 07138-09

## (undated) RX ORDER — PHENYLEPHRINE HYDROCHLORIDE 25 MG/ML
SOLUTION/ DROPS OPHTHALMIC
Status: DISPENSED
Start: 2017-06-05

## (undated) RX ORDER — PHENYLEPHRINE HYDROCHLORIDE 25 MG/ML
SOLUTION/ DROPS OPHTHALMIC
Status: DISPENSED
Start: 2017-05-08

## (undated) RX ORDER — CYCLOPENTOLATE HYDROCHLORIDE 10 MG/ML
SOLUTION/ DROPS OPHTHALMIC
Status: DISPENSED
Start: 2017-06-05

## (undated) RX ORDER — PROPARACAINE HYDROCHLORIDE 5 MG/ML
SOLUTION/ DROPS OPHTHALMIC
Status: DISPENSED
Start: 2017-05-08

## (undated) RX ORDER — TROPICAMIDE 10 MG/ML
SOLUTION/ DROPS OPHTHALMIC
Status: DISPENSED
Start: 2017-05-08

## (undated) RX ORDER — CYCLOPENTOLATE HYDROCHLORIDE 10 MG/ML
SOLUTION/ DROPS OPHTHALMIC
Status: DISPENSED
Start: 2017-05-08

## (undated) RX ORDER — TROPICAMIDE 10 MG/ML
SOLUTION/ DROPS OPHTHALMIC
Status: DISPENSED
Start: 2017-06-05